# Patient Record
Sex: FEMALE | Race: WHITE | NOT HISPANIC OR LATINO | Employment: OTHER | URBAN - METROPOLITAN AREA
[De-identification: names, ages, dates, MRNs, and addresses within clinical notes are randomized per-mention and may not be internally consistent; named-entity substitution may affect disease eponyms.]

---

## 2014-08-05 LAB
CREAT ?TM UR-SCNC: 73 UMOL/L
MICROALBUMIN UR-MCNC: 0.6 MG/L (ref 0–20)
MICROALBUMIN/CREAT UR: 8 MG/G{CREAT}

## 2018-04-11 ENCOUNTER — OFFICE VISIT (OUTPATIENT)
Dept: ENDOCRINOLOGY | Facility: HOSPITAL | Age: 68
End: 2018-04-11
Payer: MEDICARE

## 2018-04-11 VITALS
SYSTOLIC BLOOD PRESSURE: 156 MMHG | HEIGHT: 66 IN | HEART RATE: 64 BPM | WEIGHT: 154.6 LBS | BODY MASS INDEX: 24.85 KG/M2 | DIASTOLIC BLOOD PRESSURE: 72 MMHG

## 2018-04-11 DIAGNOSIS — E89.0 POSTABLATIVE HYPOTHYROIDISM: Primary | ICD-10-CM

## 2018-04-11 DIAGNOSIS — E04.2 MULTIPLE THYROID NODULES: ICD-10-CM

## 2018-04-11 PROCEDURE — 99204 OFFICE O/P NEW MOD 45 MIN: CPT | Performed by: INTERNAL MEDICINE

## 2018-04-11 RX ORDER — CITALOPRAM 20 MG/1
20 TABLET ORAL DAILY
COMMUNITY
Start: 2018-02-27

## 2018-04-11 RX ORDER — LEVOTHYROXINE SODIUM 100 MCG
100 TABLET ORAL DAILY
COMMUNITY
Start: 2018-02-27 | End: 2018-04-11 | Stop reason: SDUPTHER

## 2018-04-11 RX ORDER — CALCITRIOL 0.25 UG/1
0.25 CAPSULE, LIQUID FILLED ORAL DAILY
COMMUNITY
Start: 2018-01-02

## 2018-04-11 RX ORDER — HYDROXYCHLOROQUINE SULFATE 200 MG/1
TABLET, FILM COATED ORAL
COMMUNITY

## 2018-04-11 RX ORDER — DOXAZOSIN MESYLATE 1 MG/1
1 TABLET ORAL DAILY
COMMUNITY
Start: 2018-02-21

## 2018-04-11 RX ORDER — LEVOTHYROXINE SODIUM 100 MCG
TABLET ORAL
Qty: 90 TABLET | Refills: 3 | Status: SHIPPED | OUTPATIENT
Start: 2018-04-11 | End: 2019-04-18 | Stop reason: SDUPTHER

## 2018-04-11 RX ORDER — HYDROXYPROPYL CELLULOSE 5 MG/1
INSERT OPHTHALMIC
COMMUNITY
Start: 2018-01-10

## 2018-04-11 RX ORDER — LOSARTAN POTASSIUM 100 MG/1
100 TABLET ORAL DAILY
COMMUNITY

## 2018-04-11 RX ORDER — ROSUVASTATIN CALCIUM 20 MG/1
20 TABLET, COATED ORAL DAILY
COMMUNITY
Start: 2018-02-12 | End: 2020-04-22

## 2018-04-11 RX ORDER — AMLODIPINE BESYLATE 5 MG/1
5 TABLET ORAL 2 TIMES DAILY
COMMUNITY
Start: 2018-03-14

## 2018-04-11 RX ORDER — ASPIRIN 81 MG/1
81 TABLET ORAL DAILY
COMMUNITY

## 2018-04-11 NOTE — PROGRESS NOTES
4/11/2018    Assessment/Plan      Diagnoses and all orders for this visit:    Postablative hypothyroidism  -     SYNTHROID 100 MCG tablet; 1 tab daily  BRAND NECESSARY  -     TSH, 3rd generation Lab Collect; Future  -     T4, free Lab Collect; Future  -     TSH, 3rd generation Lab Collect; Future  -     T4, free Lab Collect; Future    Multiple thyroid nodules  -     US thyroid; Future  -     US thyroid; Future    Other orders  -     amLODIPine (NORVASC) 5 mg tablet; Take 7 5 mg by mouth daily  -     calcitriol (ROCALTROL) 0 25 mcg capsule; Take 0 25 mcg by mouth daily  -     citalopram (CeleXA) 20 mg tablet; Take 20 mg by mouth daily  -     doxazosin (CARDURA) 1 mg tablet; Take 1 mg by mouth daily  -     Discontinue: SYNTHROID 100 MCG tablet; Take 100 mcg by mouth daily  -     rosuvastatin (CRESTOR) 20 MG tablet; Take 20 mg by mouth daily  -     hydroxypropyl cellulose (LACRISERT) 5 MG INST;   -     aspirin (ECOTRIN LOW STRENGTH) 81 mg EC tablet; Take 81 mg by mouth daily  -     losartan (COZAAR) 100 MG tablet; Take 100 mg by mouth daily  -     Hydroxychloroquine Sulfate (PLAQUENIL PO); Take by mouth Take one a a half tablets daily        Assessment/Plan:  1  Hypothyroidism status post I 131 treatment:  Clinically euthyroid  Will repeat TSH and free T4  Will continue Synthroid brand 100 mcg daily  Will be in touch with the patient as results come in for changes if needed  If no changes are needed, recheck TSH and free T4 before next appointment in 1 year  She will call the office sooner if she notes any symptoms of hyperthyroidism hypothyroidism for lab check sooner  2   Thyroid nodules:  She is due for an updated ultrasound which I have ordered  If this looks stable we will check another ultrasound in 1 year which will be before next appointment        CC:  Hypothyroidism and thyroid nodules    History of Present Illness     HPI: Abdi Frazier is a 70y o  year old female with history of hyperthyroidism many years ago suspected due to Graves disease though there was a mention of a possible overactive nodule who eventually underwent I 131 treatment and now has hypothyroidism  She also has a history of thyroid nodules the dominant nodule having a benign biopsy many years ago  She also has a history of Sjogren's and follows closely with her rheumatologist as well as CKD 4 and follows closely with Nephrology  She has not needed any corticosteroid treatments or injections recently  Overall, she feels well  She denies any new symptoms  She does report some fatigue but attributes that to multiple medical issues  She denies any neck compressive symptoms  She denies any family history of thyroid cancer, thyroid disease  She denies personal history of ionizing radiation to her head or neck  She denies any palpitations, tachycardia, tremor, heat intolerance  She does report some cold intolerance  She takes her Synthroid 100 mcg brand specific daily appropriately  Review of Systems   Constitutional: Positive for fatigue  Negative for chills and fever  HENT: Negative for trouble swallowing and voice change  Eyes: Negative for visual disturbance  Respiratory: Negative for shortness of breath  Cardiovascular: Negative for chest pain, palpitations and leg swelling  Gastrointestinal: Negative for abdominal pain, nausea and vomiting  Endocrine: Positive for cold intolerance  Negative for polydipsia and polyuria  Musculoskeletal: Negative for arthralgias and myalgias  Skin: Negative for rash  Neurological: Negative for dizziness, tremors and weakness  Hematological: Negative for adenopathy  Psychiatric/Behavioral: Negative for agitation and confusion  Historical Information   History reviewed  No pertinent past medical history  History reviewed  No pertinent surgical history    Social History   History   Alcohol use Not on file     History   Drug use: Unknown     History   Smoking Status    Never Smoker   Smokeless Tobacco    Never Used     Family History:   Family History   Problem Relation Age of Onset    Ovarian cancer Mother     Heart disease Father     Hypertension Father     Hyperlipidemia Father     Cancer Sister        Meds/Allergies   Current Outpatient Prescriptions   Medication Sig Dispense Refill    amLODIPine (NORVASC) 5 mg tablet Take 7 5 mg by mouth daily      aspirin (ECOTRIN LOW STRENGTH) 81 mg EC tablet Take 81 mg by mouth daily      calcitriol (ROCALTROL) 0 25 mcg capsule Take 0 25 mcg by mouth daily      citalopram (CeleXA) 20 mg tablet Take 20 mg by mouth daily      doxazosin (CARDURA) 1 mg tablet Take 1 mg by mouth daily      Hydroxychloroquine Sulfate (PLAQUENIL PO) Take by mouth Take one a a half tablets daily      hydroxypropyl cellulose (LACRISERT) 5 MG INST       losartan (COZAAR) 100 MG tablet Take 100 mg by mouth daily      rosuvastatin (CRESTOR) 20 MG tablet Take 20 mg by mouth daily      SYNTHROID 100 MCG tablet 1 tab daily  BRAND NECESSARY  90 tablet 3     No current facility-administered medications for this visit  No Known Allergies    Objective   Vitals: Blood pressure 156/72, pulse 64, height 5' 6" (1 676 m), weight 70 1 kg (154 lb 9 6 oz)  Invasive Devices          No matching active lines, drains, or airways          Physical Exam   Constitutional: She is oriented to person, place, and time  She appears well-developed and well-nourished  No distress  HENT:   Head: Normocephalic and atraumatic  Eyes: Conjunctivae and EOM are normal  Pupils are equal, round, and reactive to light  No scleral icterus  Neck: Normal range of motion  Neck supple  No thyromegaly (no nodules palpated on today's exam ) present  Cardiovascular: Normal rate and regular rhythm  No murmur heard  Pulmonary/Chest: Effort normal and breath sounds normal  She has no wheezes  Abdominal: Soft  Bowel sounds are normal  There is no tenderness     Musculoskeletal: Normal range of motion  She exhibits no edema  Neurological: She is alert and oriented to person, place, and time  She exhibits normal muscle tone  Skin: Skin is warm and dry  No rash noted  She is not diaphoretic  Psychiatric: She has a normal mood and affect  Her behavior is normal        The history was obtained from the review of the chart and from the patient  Lab Results:      No recent blood work, but ultrasound of the thyroid from 04/11/2017 showed the following: In the right lobe there is a 0 5 x 0 4 x 0 4 cm hypoechoic/solid nodule  In the left lobe there is a rim calcified 1 5 x 0 7 x 0 8 cm echogenic nodule  03/31/2017 at [facility]:  TSH 3 97, free thyroxine index 1 9  No future appointments

## 2018-04-11 NOTE — LETTER
April 11, 2018     Mikael Fabry, MD  [address]  [facility] [address]    Patient: Abida Goodman   YOB: 1950   Date of Visit: 4/11/2018       Dear Dr Negro Lazar: Thank you for referring Abida Goodman to me for evaluation  Below are my notes for this consultation  If you have questions, please do not hesitate to call me  I look forward to following your patient along with you  Sincerely,        Ning Rodriguez DO        CC: No Recipients  Ning Rodriguez DO  4/11/2018 11:37 AM  Sign at close encounter  4/11/2018    Assessment/Plan      Diagnoses and all orders for this visit:    Postablative hypothyroidism  -     SYNTHROID 100 MCG tablet; 1 tab daily  BRAND NECESSARY  -     TSH, 3rd generation Lab Collect; Future  -     T4, free Lab Collect; Future  -     TSH, 3rd generation Lab Collect; Future  -     T4, free Lab Collect; Future    Multiple thyroid nodules  -     US thyroid; Future  -     US thyroid; Future    Other orders  -     amLODIPine (NORVASC) 5 mg tablet; Take 7 5 mg by mouth daily  -     calcitriol (ROCALTROL) 0 25 mcg capsule; Take 0 25 mcg by mouth daily  -     citalopram (CeleXA) 20 mg tablet; Take 20 mg by mouth daily  -     doxazosin (CARDURA) 1 mg tablet; Take 1 mg by mouth daily  -     Discontinue: SYNTHROID 100 MCG tablet; Take 100 mcg by mouth daily  -     rosuvastatin (CRESTOR) 20 MG tablet; Take 20 mg by mouth daily  -     hydroxypropyl cellulose (LACRISERT) 5 MG INST;   -     aspirin (ECOTRIN LOW STRENGTH) 81 mg EC tablet; Take 81 mg by mouth daily  -     losartan (COZAAR) 100 MG tablet; Take 100 mg by mouth daily  -     Hydroxychloroquine Sulfate (PLAQUENIL PO); Take by mouth Take one a a half tablets daily        Assessment/Plan:  1  Hypothyroidism status post I 131 treatment:  Clinically euthyroid  Will repeat TSH and free T4  Will continue Synthroid brand 100 mcg daily    Will be in touch with the patient as results come in for changes if needed  If no changes are needed, recheck TSH and free T4 before next appointment in 1 year  She will call the office sooner if she notes any symptoms of hyperthyroidism hypothyroidism for lab check sooner  2   Thyroid nodules:  She is due for an updated ultrasound which I have ordered  If this looks stable we will check another ultrasound in 1 year which will be before next appointment  CC:  Hypothyroidism and thyroid nodules    History of Present Illness     HPI: Markos Barone is a 70y o  year old female with history of hyperthyroidism many years ago suspected due to Graves disease though there was a mention of a possible overactive nodule who eventually underwent I 131 treatment and now has hypothyroidism  She also has a history of thyroid nodules the dominant nodule having a benign biopsy many years ago  She also has a history of Sjogren's and follows closely with her rheumatologist as well as CKD 4 and follows closely with Nephrology  She has not needed any corticosteroid treatments or injections recently  Overall, she feels well  She denies any new symptoms  She does report some fatigue but attributes that to multiple medical issues  She denies any neck compressive symptoms  She denies any family history of thyroid cancer, thyroid disease  She denies personal history of ionizing radiation to her head or neck  She denies any palpitations, tachycardia, tremor, heat intolerance  She does report some cold intolerance  She takes her Synthroid 100 mcg brand specific daily appropriately  Review of Systems   Constitutional: Positive for fatigue  Negative for chills and fever  HENT: Negative for trouble swallowing and voice change  Eyes: Negative for visual disturbance  Respiratory: Negative for shortness of breath  Cardiovascular: Negative for chest pain, palpitations and leg swelling  Gastrointestinal: Negative for abdominal pain, nausea and vomiting     Endocrine: Positive for cold intolerance  Negative for polydipsia and polyuria  Musculoskeletal: Negative for arthralgias and myalgias  Skin: Negative for rash  Neurological: Negative for dizziness, tremors and weakness  Hematological: Negative for adenopathy  Psychiatric/Behavioral: Negative for agitation and confusion  Historical Information   History reviewed  No pertinent past medical history  History reviewed  No pertinent surgical history  Social History   History   Alcohol use Not on file     History   Drug use: Unknown     History   Smoking Status    Never Smoker   Smokeless Tobacco    Never Used     Family History:   Family History   Problem Relation Age of Onset    Ovarian cancer Mother     Heart disease Father     Hypertension Father     Hyperlipidemia Father     Cancer Sister        Meds/Allergies   Current Outpatient Prescriptions   Medication Sig Dispense Refill    amLODIPine (NORVASC) 5 mg tablet Take 7 5 mg by mouth daily      aspirin (ECOTRIN LOW STRENGTH) 81 mg EC tablet Take 81 mg by mouth daily      calcitriol (ROCALTROL) 0 25 mcg capsule Take 0 25 mcg by mouth daily      citalopram (CeleXA) 20 mg tablet Take 20 mg by mouth daily      doxazosin (CARDURA) 1 mg tablet Take 1 mg by mouth daily      Hydroxychloroquine Sulfate (PLAQUENIL PO) Take by mouth Take one a a half tablets daily      hydroxypropyl cellulose (LACRISERT) 5 MG INST       losartan (COZAAR) 100 MG tablet Take 100 mg by mouth daily      rosuvastatin (CRESTOR) 20 MG tablet Take 20 mg by mouth daily      SYNTHROID 100 MCG tablet 1 tab daily  BRAND NECESSARY  90 tablet 3     No current facility-administered medications for this visit  No Known Allergies    Objective   Vitals: Blood pressure 156/72, pulse 64, height 5' 6" (1 676 m), weight 70 1 kg (154 lb 9 6 oz)  Invasive Devices          No matching active lines, drains, or airways          Physical Exam   Constitutional: She is oriented to person, place, and time   She appears well-developed and well-nourished  No distress  HENT:   Head: Normocephalic and atraumatic  Eyes: Conjunctivae and EOM are normal  Pupils are equal, round, and reactive to light  No scleral icterus  Neck: Normal range of motion  Neck supple  No thyromegaly (no nodules palpated on today's exam ) present  Cardiovascular: Normal rate and regular rhythm  No murmur heard  Pulmonary/Chest: Effort normal and breath sounds normal  She has no wheezes  Abdominal: Soft  Bowel sounds are normal  There is no tenderness  Musculoskeletal: Normal range of motion  She exhibits no edema  Neurological: She is alert and oriented to person, place, and time  She exhibits normal muscle tone  Skin: Skin is warm and dry  No rash noted  She is not diaphoretic  Psychiatric: She has a normal mood and affect  Her behavior is normal        The history was obtained from the review of the chart and from the patient  Lab Results:      No recent blood work, but ultrasound of the thyroid from 04/11/2017 showed the following: In the right lobe there is a 0 5 x 0 4 x 0 4 cm hypoechoic/solid nodule  In the left lobe there is a rim calcified 1 5 x 0 7 x 0 8 cm echogenic nodule  03/31/2017 at [facility]:  TSH 3 97, free thyroxine index 1 9  No future appointments

## 2018-05-01 ENCOUNTER — TELEPHONE (OUTPATIENT)
Dept: ENDOCRINOLOGY | Facility: HOSPITAL | Age: 68
End: 2018-05-01

## 2018-05-01 NOTE — TELEPHONE ENCOUNTER
Received labs and ultrasound as listed below  Please let the patient know her thyroid blood work looks good and her ultrasound looks stable without change  Received labs from [facility] done on 04/27/2018: White blood cells 5 1, hemoglobin 10 6, hematocrit 34 2, platelets 544, 25 hydroxy vitamin-D 33 8, free T4 0 84, TSH 2 71, ferritin 19, iron 85, TIBC 405, saturation 21, , calcium 10 1  Ultrasound of the thyroid done on 04/27/2018 at [facility]:  In the right lobe there are subcentimeter cystic nodule between 5 and 6 mm in greatest dimension with no interval change  In the left lobe there is a hypoechoic nodule peripheral eggshell calcifications measuring 1 5 x 0 7 x 0 8 cm unchanged

## 2018-12-18 DIAGNOSIS — E04.2 MULTIPLE THYROID NODULES: ICD-10-CM

## 2019-04-18 ENCOUNTER — OFFICE VISIT (OUTPATIENT)
Dept: ENDOCRINOLOGY | Facility: HOSPITAL | Age: 69
End: 2019-04-18
Payer: MEDICARE

## 2019-04-18 VITALS
SYSTOLIC BLOOD PRESSURE: 146 MMHG | WEIGHT: 152.8 LBS | HEIGHT: 66 IN | DIASTOLIC BLOOD PRESSURE: 80 MMHG | BODY MASS INDEX: 24.56 KG/M2 | HEART RATE: 80 BPM

## 2019-04-18 DIAGNOSIS — E04.2 MULTIPLE THYROID NODULES: Primary | ICD-10-CM

## 2019-04-18 DIAGNOSIS — E89.0 POSTABLATIVE HYPOTHYROIDISM: ICD-10-CM

## 2019-04-18 PROCEDURE — 99214 OFFICE O/P EST MOD 30 MIN: CPT | Performed by: NURSE PRACTITIONER

## 2019-04-18 RX ORDER — LEVOTHYROXINE SODIUM 100 MCG
TABLET ORAL
Qty: 90 TABLET | Refills: 3 | Status: SHIPPED | OUTPATIENT
Start: 2019-04-18 | End: 2019-12-30 | Stop reason: SDUPTHER

## 2019-10-25 ENCOUNTER — OFFICE VISIT (OUTPATIENT)
Dept: URGENT CARE | Facility: CLINIC | Age: 69
End: 2019-10-25
Payer: MEDICARE

## 2019-10-25 ENCOUNTER — APPOINTMENT (OUTPATIENT)
Dept: RADIOLOGY | Facility: CLINIC | Age: 69
End: 2019-10-25
Payer: MEDICARE

## 2019-10-25 VITALS
TEMPERATURE: 98.7 F | WEIGHT: 153 LBS | RESPIRATION RATE: 16 BRPM | BODY MASS INDEX: 24.59 KG/M2 | OXYGEN SATURATION: 98 % | DIASTOLIC BLOOD PRESSURE: 66 MMHG | SYSTOLIC BLOOD PRESSURE: 122 MMHG | HEART RATE: 65 BPM | HEIGHT: 66 IN

## 2019-10-25 DIAGNOSIS — M25.551 PAIN OF RIGHT HIP JOINT: ICD-10-CM

## 2019-10-25 DIAGNOSIS — M54.31 SCIATIC PAIN, RIGHT: Primary | ICD-10-CM

## 2019-10-25 PROCEDURE — G0463 HOSPITAL OUTPT CLINIC VISIT: HCPCS | Performed by: PREVENTIVE MEDICINE

## 2019-10-25 PROCEDURE — 99203 OFFICE O/P NEW LOW 30 MIN: CPT | Performed by: PREVENTIVE MEDICINE

## 2019-10-25 NOTE — PROGRESS NOTES
[facility]        NAME: Michaela Saez is a 78 y o  female  : 1950    MRN: [id number]  DATE: 2019  TIME: 4:16 PM    Assessment and Plan   Sciatic pain, right [M54 31]  1  Sciatic pain, right     2  Pain of right hip joint  XR hip/pelv 2-3 vws right if performed         Patient Instructions       Follow up with PCP in 3-5 days  Proceed to  ER if symptoms worsen  Chief Complaint     Chief Complaint   Patient presents with    Hip Pain     Pt reports falling approx 3 weeks ago  She cpresents today with ongoing right sided hip pain that radiates down her tight leg  History of Present Illness       Three weeks ago she fell  Since that time she is having aching pain that proceed from the low back area down to the buttock down into the leg  She also notices some numbness at the bottom of right foot  She has not noticed the right leg buckling on her  Review of Systems   Review of Systems   Musculoskeletal: Positive for back pain and myalgias  Current Medications       Current Outpatient Medications:     amLODIPine (NORVASC) 5 mg tablet, Take 7 5 mg by mouth daily, Disp: , Rfl:     aspirin (ECOTRIN LOW STRENGTH) 81 mg EC tablet, Take 81 mg by mouth daily, Disp: , Rfl:     calcitriol (ROCALTROL) 0 25 mcg capsule, Take 0 25 mcg by mouth daily, Disp: , Rfl:     citalopram (CeleXA) 20 mg tablet, Take 20 mg by mouth daily, Disp: , Rfl:     doxazosin (CARDURA) 1 mg tablet, Take 1 mg by mouth daily, Disp: , Rfl:     Hydroxychloroquine Sulfate (PLAQUENIL PO), Take by mouth Take one a a half tablets daily, Disp: , Rfl:     hydroxypropyl cellulose (LACRISERT) 5 MG INST, , Disp: , Rfl:     losartan (COZAAR) 100 MG tablet, Take 100 mg by mouth daily, Disp: , Rfl:     SYNTHROID 100 MCG tablet, 1 tab daily    BRAND NECESSARY , Disp: 90 tablet, Rfl: 3    rosuvastatin (CRESTOR) 20 MG tablet, Take 20 mg by mouth daily, Disp: , Rfl:     Current Allergies     Allergies as of 10/25/2019    (No Known Allergies)            The following portions of the patient's history were reviewed and updated as appropriate: allergies, current medications, past family history, past medical history, past social history, past surgical history and problem list      Past Medical History:   Diagnosis Date    Renal disease        No past surgical history on file  Family History   Problem Relation Age of Onset    Ovarian cancer Mother     Heart disease Father     Hypertension Father     Hyperlipidemia Father     Cancer Sister          Medications have been verified  Objective   /66   Pulse 65   Temp 98 7 °F (37 1 °C)   Resp 16   Ht 5' 6" (1 676 m)   Wt 69 4 kg (153 lb)   SpO2 98%   BMI 24 69 kg/m²        Physical Exam     Physical Exam   Musculoskeletal:   Seated straight leg raise is positive on the right at 90° negative on the left and 90°  Lying straight leg raise is positive on the right at 45° negative on the left at 45°  Both knee jerks are 1+ both ankle jerks are 0  She has excellent strength of the right lower extremity to include hamstring quadriceps hip flexor plantar and dorsiflexion  Mild decreased to pinprick on the bottom of the right foot

## 2019-10-25 NOTE — PATIENT INSTRUCTIONS
I believe your having sciatic nerve irritation  When you see your back and spine specialist you need to have them evaluate you and prescribed treatment     In the meantime use copious amounts of ice and tramadol for pain

## 2019-12-30 DIAGNOSIS — E89.0 POSTABLATIVE HYPOTHYROIDISM: Primary | ICD-10-CM

## 2019-12-30 RX ORDER — LEVOTHYROXINE SODIUM 100 MCG
TABLET ORAL
Qty: 90 TABLET | Refills: 3 | Status: SHIPPED | OUTPATIENT
Start: 2019-12-30 | End: 2020-04-22 | Stop reason: SDUPTHER

## 2019-12-30 NOTE — TELEPHONE ENCOUNTER
Mendel sent a request to refill pt's Synthroid  She saw Alanna Kerr in April and has a follow up 4/20/20 with you

## 2020-04-22 ENCOUNTER — TELEMEDICINE (OUTPATIENT)
Dept: ENDOCRINOLOGY | Facility: HOSPITAL | Age: 70
End: 2020-04-22
Payer: MEDICARE

## 2020-04-22 DIAGNOSIS — E89.0 POSTABLATIVE HYPOTHYROIDISM: ICD-10-CM

## 2020-04-22 DIAGNOSIS — E04.2 MULTIPLE THYROID NODULES: Primary | ICD-10-CM

## 2020-04-22 PROCEDURE — 99441 PR PHYS/QHP TELEPHONE EVALUATION 5-10 MIN: CPT | Performed by: INTERNAL MEDICINE

## 2020-04-22 RX ORDER — PRAVASTATIN SODIUM 20 MG
40 TABLET ORAL DAILY
COMMUNITY
Start: 2020-01-27

## 2020-04-22 RX ORDER — LEVOTHYROXINE SODIUM 100 MCG
TABLET ORAL
Qty: 90 TABLET | Refills: 3 | Status: SHIPPED | OUTPATIENT
Start: 2020-04-22 | End: 2020-09-14 | Stop reason: SDUPTHER

## 2020-09-08 ENCOUNTER — TELEPHONE (OUTPATIENT)
Dept: ENDOCRINOLOGY | Facility: HOSPITAL | Age: 70
End: 2020-09-08

## 2020-09-09 ENCOUNTER — TELEPHONE (OUTPATIENT)
Dept: ENDOCRINOLOGY | Facility: HOSPITAL | Age: 70
End: 2020-09-09

## 2020-09-09 NOTE — TELEPHONE ENCOUNTER
Pt has 10/29 appt  She had labs done at [facility] on 9/2 for pth & also thyroid labs  I explained we have not received results for thyroid labs  I will request from [facility] & have Dr Emma Donohue review

## 2020-09-14 DIAGNOSIS — E89.0 POSTABLATIVE HYPOTHYROIDISM: ICD-10-CM

## 2020-09-14 DIAGNOSIS — E04.2 MULTIPLE THYROID NODULES: Primary | ICD-10-CM

## 2020-09-14 DIAGNOSIS — E21.3 HYPERPARATHYROIDISM (HCC): ICD-10-CM

## 2020-09-14 RX ORDER — LEVOTHYROXINE SODIUM 100 MCG
TABLET ORAL
Qty: 90 TABLET | Refills: 3
Start: 2020-09-14 | End: 2020-10-29 | Stop reason: SDUPTHER

## 2020-09-14 NOTE — TELEPHONE ENCOUNTER
I would suggest that we increase her Synthroid dose to 1 tablet days a week 1 5 tablets on Sundays and repeat labs prior to her next appointment

## 2020-09-14 NOTE — TELEPHONE ENCOUNTER
Please let the patient know I reviewed her labs from 09/02  Her thyroid levels are much lower since prior values from April  Has she missed any doses of Synthroid? Also her PTH level is elevated which we can review at upcoming appointment next month  Labs from [facility] on 09/02/2020:  TSH 13 36, free T4 0 59, creatinine 2 28, GFR 21, calcium 9 8,

## 2020-10-29 ENCOUNTER — OFFICE VISIT (OUTPATIENT)
Dept: ENDOCRINOLOGY | Facility: HOSPITAL | Age: 70
End: 2020-10-29
Payer: MEDICARE

## 2020-10-29 VITALS
HEIGHT: 66 IN | DIASTOLIC BLOOD PRESSURE: 72 MMHG | HEART RATE: 56 BPM | BODY MASS INDEX: 22.53 KG/M2 | TEMPERATURE: 98.2 F | SYSTOLIC BLOOD PRESSURE: 154 MMHG | WEIGHT: 140.2 LBS

## 2020-10-29 DIAGNOSIS — E89.0 POSTABLATIVE HYPOTHYROIDISM: Primary | ICD-10-CM

## 2020-10-29 DIAGNOSIS — E04.2 MULTIPLE THYROID NODULES: ICD-10-CM

## 2020-10-29 PROCEDURE — 99213 OFFICE O/P EST LOW 20 MIN: CPT | Performed by: INTERNAL MEDICINE

## 2020-10-29 RX ORDER — LEVOTHYROXINE SODIUM 100 MCG
TABLET ORAL
Qty: 102 TABLET | Refills: 3 | Status: SHIPPED | OUTPATIENT
Start: 2020-10-29 | End: 2021-05-04 | Stop reason: SDUPTHER

## 2020-10-29 RX ORDER — SODIUM BICARBONATE 650 MG/1
650 TABLET ORAL DAILY
COMMUNITY
Start: 2020-09-29

## 2021-01-22 ENCOUNTER — TELEPHONE (OUTPATIENT)
Dept: ENDOCRINOLOGY | Facility: HOSPITAL | Age: 71
End: 2021-01-22

## 2021-01-22 NOTE — TELEPHONE ENCOUNTER
Reviewed lab work from [facility] on 01/20/2021  TSH was slightly elevated at 5 75 and free T4 was normal at 0 94  These values are improved since her last appointment  If she is feeling well, suggest continue current regimen repeat labs prior to next appointment as planned and trend labs over time

## 2021-04-28 LAB
CREAT ?TM UR-SCNC: 48 UMOL/L
EXT PROTEIN URINE: 8
PROT/CREAT UR: 0.16 MG/G{CREAT}

## 2021-05-04 ENCOUNTER — OFFICE VISIT (OUTPATIENT)
Dept: ENDOCRINOLOGY | Facility: HOSPITAL | Age: 71
End: 2021-05-04
Payer: MEDICARE

## 2021-05-04 ENCOUNTER — TELEPHONE (OUTPATIENT)
Dept: ADMINISTRATIVE | Facility: OTHER | Age: 71
End: 2021-05-04

## 2021-05-04 VITALS
SYSTOLIC BLOOD PRESSURE: 132 MMHG | HEIGHT: 66 IN | DIASTOLIC BLOOD PRESSURE: 70 MMHG | HEART RATE: 61 BPM | WEIGHT: 141.8 LBS | BODY MASS INDEX: 22.79 KG/M2

## 2021-05-04 DIAGNOSIS — E04.2 MULTIPLE THYROID NODULES: ICD-10-CM

## 2021-05-04 DIAGNOSIS — E21.3 HYPERPARATHYROIDISM (HCC): ICD-10-CM

## 2021-05-04 DIAGNOSIS — E89.0 POSTABLATIVE HYPOTHYROIDISM: Primary | ICD-10-CM

## 2021-05-04 PROCEDURE — 99214 OFFICE O/P EST MOD 30 MIN: CPT | Performed by: PHYSICIAN ASSISTANT

## 2021-05-04 RX ORDER — LEVOTHYROXINE SODIUM 100 MCG
TABLET ORAL
Qty: 110 TABLET | Refills: 3 | Status: SHIPPED | OUTPATIENT
Start: 2021-05-04 | End: 2022-03-31

## 2021-05-04 NOTE — LETTER
Procedure Request Form: Colonoscopy      Date Requested: 21  Patient: Daja Cover Long  Patient : 1950   Referring Provider: Iram Stein, MD        Date of Procedure ______________________________       The above patient has informed us that they have completed their   most recent Colonoscopy at your facility  Please complete   this form and attach all corresponding procedure reports/results  Comments __________________________________________________________  ____________________________________________________________________  ____________________________________________________________________  ____________________________________________________________________    Facility Completing Procedure _________________________________________    Form Completed By (print name) _______________________________________      Signature __________________________________________________________      These reports are needed for  compliance    Please fax this completed form and a copy of the procedure report to our office located at [place] [address] as soon as possible to [phone] attention Angelia Matamoros: Phone [phone]    We thank you for your assistance in treating our mutual patient

## 2021-05-04 NOTE — TELEPHONE ENCOUNTER
----- Message from Marko Song, [address] [place] sent at 5/4/2021 10:29 AM EDT -----  Regarding: colonoscopy  05/04/21 10:29 AM    Hello, our patient Paris Melo has had a colonoscopy completed/performed  Please assist in updating the patient chart by [facility]  The date of service is in the past 10 years      Thank you,  Marko Song MA  [facility] [place]

## 2021-05-04 NOTE — PATIENT INSTRUCTIONS
Increase Synthroid to 100 mcg 5 days a week, 1 5 tablets on Saturday, and 2 tablets on Sunday  Repeat lab work in 6 weeks  Follow up in 6 months with lab work and ultrasound completed 
None

## 2021-05-04 NOTE — PROGRESS NOTES
Graciela Wan 66 y o  female MRN: [id number]    Encounter: [id number]      Assessment/Plan     Assessment: This is a 66y o -year-old female with hypothyroidism and thyroid nodule  Plan:    1  Hypothyroidism:  No recent thyroid panel, but thyroid lab work in January did show an elevated TSH and normal free T4  Has had improvement in her symptoms, but still does fill a little run down  Will increase her Synthroid to 100 mcg 5 days a week, 1 5 tablets on Saturday, and 2 tablets on Sunday  Repeat lab work in 6 weeks  Will give a call with results  If needed may make additional adjustments  Follow-up in 6 months with lab work completed prior to visit  2  Thyroid nodules:  Ultrasound from October 2021 showed stable bilateral thyroid nodules  Due to her concerns, will continue with yearly follow-ups of ultrasound  If there is any concerns for change in symptoms in the meantime, please contact the office  Repeat ultrasound October 2022  3  Hyperparathyroidism:  Levels are stable  This is likely due to her chronic kidney disease  Vitamin-D and calciums are within normal range  Will continue to monitor at this time  CC:   Thyroid follow-up    History of Present Illness     HPI:  Sammy Colon is a 69y o  year old female with history of hyperthyroidism many years ago suspected to be due to Graves disease other was mention of a possible overactive nodule who eventually underwent I 131 treatment and now with hypothyroidism presents for a follow-up appointment  She also has an elevated parathyroid hormone level, but this could be related to her CKD stage 4 which she is following up with nephrology  She has history of a thyroid nodule with a benign biopsy many years ago  She continues on Synthroid brand 100 mcg daily 6 days a week 2 tablets on Sundays  She presents today overall feeling okay  She is taking her Synthroid appropriately  She is not on any calcium, iron    She stopped her PPI back in April   No multivitamin or other B complex or other over-the-counter supplements or vitamins  Continues to have some fatigue  Denies any heat or cold intolerance, palpitations, abdominal pain, diarrhea or constipation, tremors, dry skin, brittle nails, hair loss, depression or anxiety  No new medical conditions since last office visit  Review of Systems   Constitutional: Positive for fatigue  Negative for activity change, appetite change, diaphoresis and unexpected weight change  HENT: Negative for sore throat, trouble swallowing and voice change  Eyes: Negative for visual disturbance  Respiratory: Negative for chest tightness and shortness of breath  Cardiovascular: Negative for chest pain, palpitations and leg swelling  Gastrointestinal: Negative for abdominal pain, constipation and diarrhea  Endocrine: Negative for cold intolerance, heat intolerance, polydipsia, polyphagia and polyuria  Skin: Negative for rash  Neurological: Negative for dizziness, tremors, light-headedness, numbness and headaches  Hematological: Negative for adenopathy  Psychiatric/Behavioral: Negative for dysphoric mood and sleep disturbance  The patient is not nervous/anxious  Historical Information   Past Medical History:   Diagnosis Date    Renal disease      History reviewed  No pertinent surgical history    Social History   Social History     Substance and Sexual Activity   Alcohol Use None     Social History     Substance and Sexual Activity   Drug Use Not on file     Social History     Tobacco Use   Smoking Status Never Smoker   Smokeless Tobacco Never Used     Family History:   Family History   Problem Relation Age of Onset    Ovarian cancer Mother     Heart disease Father     Hypertension Father     Hyperlipidemia Father     Cancer Sister        Meds/Allergies   Current Outpatient Medications   Medication Sig Dispense Refill    amLODIPine (NORVASC) 5 mg tablet Take 5 mg by mouth 2 (two) times a day       aspirin (ECOTRIN LOW STRENGTH) 81 mg EC tablet Take 81 mg by mouth daily      calcitriol (ROCALTROL) 0 25 mcg capsule Take 0 25 mcg by mouth daily      citalopram (CeleXA) 20 mg tablet Take 20 mg by mouth daily      doxazosin (CARDURA) 1 mg tablet Take 1 mg by mouth daily      hydroxychloroquine (Plaquenil) 200 mg tablet Take by mouth Take one a a half tablets daily       hydroxypropyl cellulose (LACRISERT) 5 MG INST       losartan (COZAAR) 100 MG tablet Take 100 mg by mouth daily      pravastatin (PRAVACHOL) 20 mg tablet       sodium bicarbonate 650 mg tablet Take 650 mg by mouth 2 (two) times a day      Synthroid 100 MCG tablet 1 tab daily 6 days a week 2 tablets on Sundays  BRAND NECESSARY  102 tablet 3     No current facility-administered medications for this visit  No Known Allergies    Objective   Vitals: Blood pressure 132/70, pulse 61, height 5' 6" (1 676 m), weight 64 3 kg (141 lb 12 8 oz)  Physical Exam  Vitals signs and nursing note reviewed  Constitutional:       General: She is not in acute distress  HENT:      Head: Normocephalic and atraumatic  Eyes:      Pupils: Pupils are equal, round, and reactive to light  Neck:      Musculoskeletal: Normal range of motion  Thyroid: Thyroid mass present  No thyromegaly or thyroid tenderness  Cardiovascular:      Rate and Rhythm: Normal rate and regular rhythm  Heart sounds: No murmur  Pulmonary:      Effort: Pulmonary effort is normal  No respiratory distress  Breath sounds: Normal breath sounds  No wheezing  Musculoskeletal:         General: No swelling  Lymphadenopathy:      Cervical: No cervical adenopathy  Neurological:      Mental Status: She is alert and oriented to person, place, and time  Mental status is at baseline  Sensory: No sensory deficit  Motor: No tremor        Deep Tendon Reflexes:      Reflex Scores:       Patellar reflexes are 2+ on the right side and 2+ on the left side   Psychiatric:         Mood and Affect: Mood normal          Behavior: Behavior normal          Thought Content: Thought content normal          The history was obtained from the review of the chart, patient  Portions of the record may have been created with voice recognition software  Occasional wrong word or "sound a like" substitutions may have occurred due to the inherent limitations of voice recognition software  Read the chart carefully and recognize, using context, where substitutions have occurred

## 2021-05-05 NOTE — TELEPHONE ENCOUNTER
Upon review of the In Basket request and the patient's chart, initial outreach has been made via fax, please see Contacts section for details       Thank you  Espinoza Olvieira MA

## 2021-05-06 NOTE — TELEPHONE ENCOUNTER
Upon review of the In Basket request we were able to locate, review, and update the patient chart as requested for CRC: Colonoscopy  Any additional questions or concerns should be emailed to the Practice Liaisons via [email or web address] email, please do not reply via In Basket      Thank you  Bridgett Howard MA

## 2021-11-09 ENCOUNTER — OFFICE VISIT (OUTPATIENT)
Dept: ENDOCRINOLOGY | Facility: HOSPITAL | Age: 71
End: 2021-11-09
Payer: MEDICARE

## 2021-11-09 VITALS
HEART RATE: 60 BPM | DIASTOLIC BLOOD PRESSURE: 62 MMHG | SYSTOLIC BLOOD PRESSURE: 118 MMHG | HEIGHT: 66 IN | WEIGHT: 142.4 LBS | BODY MASS INDEX: 22.88 KG/M2

## 2021-11-09 DIAGNOSIS — E89.0 POSTABLATIVE HYPOTHYROIDISM: Primary | ICD-10-CM

## 2021-11-09 DIAGNOSIS — E04.2 MULTIPLE THYROID NODULES: ICD-10-CM

## 2021-11-09 PROBLEM — I10 HYPERTENSION: Status: ACTIVE | Noted: 2021-11-09

## 2021-11-09 PROBLEM — M35.00 SJOGREN'S DISEASE (HCC): Status: ACTIVE | Noted: 2021-11-09

## 2021-11-09 PROBLEM — N18.9 CHRONIC KIDNEY DISEASE: Status: ACTIVE | Noted: 2021-11-09

## 2021-11-09 PROCEDURE — 99214 OFFICE O/P EST MOD 30 MIN: CPT | Performed by: INTERNAL MEDICINE

## 2021-11-09 RX ORDER — CYCLOSPORINE 0.5 MG/ML
EMULSION OPHTHALMIC
COMMUNITY
Start: 2021-11-02

## 2021-11-09 RX ORDER — OMEPRAZOLE 20 MG/1
CAPSULE, DELAYED RELEASE ORAL
COMMUNITY
Start: 2021-10-18

## 2022-03-30 DIAGNOSIS — E89.0 POSTABLATIVE HYPOTHYROIDISM: ICD-10-CM

## 2022-03-31 RX ORDER — LEVOTHYROXINE SODIUM 100 MCG
TABLET ORAL
Qty: 110 TABLET | Refills: 3 | Status: SHIPPED | OUTPATIENT
Start: 2022-03-31

## 2023-02-28 DIAGNOSIS — E89.0 POSTABLATIVE HYPOTHYROIDISM: ICD-10-CM

## 2023-03-01 RX ORDER — LEVOTHYROXINE SODIUM 100 MCG
TABLET ORAL
Qty: 107 TABLET | Refills: 3 | Status: SHIPPED | OUTPATIENT
Start: 2023-03-01 | End: 2023-03-02 | Stop reason: SDUPTHER

## 2023-03-02 ENCOUNTER — OFFICE VISIT (OUTPATIENT)
Dept: ENDOCRINOLOGY | Facility: CLINIC | Age: 73
End: 2023-03-02

## 2023-03-02 VITALS
SYSTOLIC BLOOD PRESSURE: 136 MMHG | BODY MASS INDEX: 23.91 KG/M2 | WEIGHT: 148.8 LBS | HEIGHT: 66 IN | DIASTOLIC BLOOD PRESSURE: 70 MMHG | HEART RATE: 58 BPM

## 2023-03-02 DIAGNOSIS — E04.2 MULTIPLE THYROID NODULES: Primary | ICD-10-CM

## 2023-03-02 DIAGNOSIS — E89.0 POSTABLATIVE HYPOTHYROIDISM: ICD-10-CM

## 2023-03-02 DIAGNOSIS — E21.3 HYPERPARATHYROIDISM (HCC): ICD-10-CM

## 2023-03-02 RX ORDER — LEVOTHYROXINE SODIUM 100 MCG
TABLET ORAL
Qty: 107 TABLET | Refills: 3 | Status: SHIPPED | OUTPATIENT
Start: 2023-03-02

## 2023-03-02 NOTE — PROGRESS NOTES
3/2/2023    Assessment/Plan      Diagnoses and all orders for this visit:    Multiple thyroid nodules  -     US thyroid; Future    Postablative hypothyroidism  -     TSH, 3rd generation; Future  -     T4, free; Future  -     Synthroid 100 MCG tablet; TAKE 1 TABLET BY MOUTH   DAILY 5 DAYS PER WEEK, 1  AND 1/2 TABLETS ON  SATURDAY, AND 2 TABLETS ON  SUNDAYS    Hyperparathyroidism ([facility] [address] )  -     Comprehensive metabolic panel; Future  -     PTH, intact; Future  -     Vitamin D 25 hydroxy; Future        Assessment/Plan:  1  Hypothyroidism: Clinically biochemically overall doing well on current regimen  We will continue current regimen and see her back in the office in 1 year with labs just prior  2   Thyroid nodules: These appear stable  Repeat ultrasound prior to next appointment should be in 1 year  3   Hyperparathyroidism: Suspected to be on the basis of CKD  Follows closely with nephrology  CC: Follow-up    History of Present Illness     HPI: Emily Shin is a 75y o  year old female with history of many years ago suspected to be due to Graves' disease so there was a mention in prior records of possible overactive nodules, but in any case she underwent radioactive iodine treatment now with post ablative hypothyroidism who presents for a follow-up appointment  She also has a history of CKD and follows closely with nephrology  She also reports following with cardiology regularly as well  She has a history of hyperparathyroidism which is suspected to be due to chronic kidney disease  She does have a history of thyroid nodule with a benign biopsy years ago  She continues on Synthroid brand specific 100 mcg tablets and takes 1 tablet 5 days a week and 1 5 tablets Saturdays and 2 tabs Sundays  Today for a follow-up visit overall feeling well  No new neck compressive symptoms  Review of Systems   Constitutional: Negative for fatigue  HENT: Negative for trouble swallowing and voice change      Eyes: Negative for visual disturbance  Respiratory: Negative for shortness of breath  Cardiovascular: Negative for palpitations and leg swelling  Gastrointestinal: Negative for abdominal pain, nausea and vomiting  Endocrine: Negative for polydipsia and polyuria  Musculoskeletal: Negative for arthralgias and myalgias  Skin: Negative for rash  Neurological: Negative for dizziness, tremors and weakness  Hematological: Negative for adenopathy  Psychiatric/Behavioral: Negative for agitation and confusion  Historical Information   Past Medical History:   Diagnosis Date   • Renal disease      History reviewed  No pertinent surgical history    Social History   Social History     Substance and Sexual Activity   Alcohol Use None     Social History     Substance and Sexual Activity   Drug Use Not on file     Social History     Tobacco Use   Smoking Status Never   Smokeless Tobacco Never     Family History:   Family History   Problem Relation Age of Onset   • Ovarian cancer Mother    • Heart disease Father    • Hypertension Father    • Hyperlipidemia Father    • Cancer Sister        Meds/Allergies   Current Outpatient Medications   Medication Sig Dispense Refill   • amLODIPine (NORVASC) 5 mg tablet Take 5 mg by mouth 2 (two) times a day      • aspirin (ECOTRIN LOW STRENGTH) 81 mg EC tablet Take 81 mg by mouth daily     • calcitriol (ROCALTROL) 0 25 mcg capsule Take 0 25 mcg by mouth daily     • citalopram (CeleXA) 20 mg tablet Take 20 mg by mouth daily     • doxazosin (CARDURA) 1 mg tablet Take 1 mg by mouth daily     • hydroxychloroquine (PLAQUENIL) 200 mg tablet Take by mouth Take one a a half tablets daily      • losartan (COZAAR) 100 MG tablet Take 100 mg by mouth daily     • omeprazole (PriLOSEC) 20 mg delayed release capsule      • pravastatin (PRAVACHOL) 20 mg tablet Take 40 mg by mouth daily       • Restasis 0 05 % ophthalmic emulsion      • sodium bicarbonate 650 mg tablet Take 650 mg by mouth daily • Synthroid 100 MCG tablet TAKE 1 TABLET BY MOUTH   DAILY 5 DAYS PER WEEK, 1  AND 1/2 TABLETS ON  SATURDAY, AND 2 TABLETS ON  SUNDAYS 107 tablet 3   • hydroxypropyl cellulose (LACRISERT) 5 MG INST   (Patient not taking: Reported on 3/2/2023)       No current facility-administered medications for this visit  No Known Allergies    Objective   Vitals: Blood pressure 136/70, pulse 58, height 5' 6" (1 676 m), weight 67 5 kg (148 lb 12 8 oz)  Invasive Devices     None                 Physical Exam  Vitals reviewed  Constitutional:       General: She is not in acute distress  Appearance: She is well-developed  She is not diaphoretic  HENT:      Head: Normocephalic and atraumatic  Eyes:      Conjunctiva/sclera: Conjunctivae normal       Pupils: Pupils are equal, round, and reactive to light  Neck:      Thyroid: No thyromegaly  Cardiovascular:      Rate and Rhythm: Normal rate and regular rhythm  Pulmonary:      Effort: Pulmonary effort is normal  No respiratory distress  Breath sounds: Normal breath sounds  Abdominal:      General: Bowel sounds are normal       Palpations: Abdomen is soft  Musculoskeletal:         General: Normal range of motion  Cervical back: Normal range of motion and neck supple  Skin:     General: Skin is warm and dry  Findings: No rash  Neurological:      Mental Status: She is alert and oriented to person, place, and time  Motor: No abnormal muscle tone  Psychiatric:         Behavior: Behavior normal          The history was obtained from the review of the chart and from the patient  Lab Results:      Ultrasound of the thyroid at [facility] on 10/21/2022: In the right lobe of the thyroid in the anterior aspect the mid right lobe is an anechoic nodule appearing like a cyst measuring 5 x 3 x 5 mm  In the left lobe of the thyroid there is a hypoechoic nodule measuring 14 x 8 x 8 mm previously 14 mm in greatest dimension      Labs from [facility] on 10/21/2022:  TSH 2 04, free T41 01    No future appointments  Portions of the record may have been created with voice recognition software  Occasional wrong word or "sound a like" substitutions may have occurred due to the inherent limitations of voice recognition software  Read the chart carefully and recognize, using context, where substitutions have occurred

## 2023-10-17 DIAGNOSIS — E89.0 POSTABLATIVE HYPOTHYROIDISM: ICD-10-CM

## 2023-10-17 RX ORDER — LEVOTHYROXINE SODIUM 100 MCG
TABLET ORAL
Qty: 107 TABLET | Refills: 3 | Status: SHIPPED | OUTPATIENT
Start: 2023-10-17

## 2024-03-01 ENCOUNTER — APPOINTMENT (OUTPATIENT)
Dept: LAB | Facility: HOSPITAL | Age: 74
End: 2024-03-01
Payer: MEDICARE

## 2024-03-01 ENCOUNTER — HOSPITAL ENCOUNTER (OUTPATIENT)
Dept: ULTRASOUND IMAGING | Facility: HOSPITAL | Age: 74
End: 2024-03-01
Attending: INTERNAL MEDICINE
Payer: MEDICARE

## 2024-03-01 DIAGNOSIS — E21.3 HYPERPARATHYROIDISM (HCC): ICD-10-CM

## 2024-03-01 DIAGNOSIS — E04.2 MULTIPLE THYROID NODULES: ICD-10-CM

## 2024-03-01 DIAGNOSIS — E89.0 POSTABLATIVE HYPOTHYROIDISM: ICD-10-CM

## 2024-03-01 LAB
25(OH)D3 SERPL-MCNC: 71.5 NG/ML (ref 30–100)
ALBUMIN SERPL BCP-MCNC: 4.1 G/DL (ref 3.5–5)
ALP SERPL-CCNC: 37 U/L (ref 34–104)
ALT SERPL W P-5'-P-CCNC: 22 U/L (ref 7–52)
ANION GAP SERPL CALCULATED.3IONS-SCNC: 6 MMOL/L
AST SERPL W P-5'-P-CCNC: 24 U/L (ref 13–39)
BILIRUB SERPL-MCNC: 0.45 MG/DL (ref 0.2–1)
BUN SERPL-MCNC: 44 MG/DL (ref 5–25)
CALCIUM SERPL-MCNC: 9.7 MG/DL (ref 8.4–10.2)
CHLORIDE SERPL-SCNC: 110 MMOL/L (ref 96–108)
CO2 SERPL-SCNC: 24 MMOL/L (ref 21–32)
CREAT SERPL-MCNC: 2.31 MG/DL (ref 0.6–1.3)
GFR SERPL CREATININE-BSD FRML MDRD: 20 ML/MIN/1.73SQ M
GLUCOSE P FAST SERPL-MCNC: 85 MG/DL (ref 65–99)
POTASSIUM SERPL-SCNC: 4.8 MMOL/L (ref 3.5–5.3)
PROT SERPL-MCNC: 7.7 G/DL (ref 6.4–8.4)
PTH-INTACT SERPL-MCNC: 259.2 PG/ML (ref 12–88)
SODIUM SERPL-SCNC: 140 MMOL/L (ref 135–147)
T4 FREE SERPL-MCNC: 0.86 NG/DL (ref 0.61–1.12)
TSH SERPL DL<=0.05 MIU/L-ACNC: 1.99 UIU/ML (ref 0.45–4.5)

## 2024-03-01 PROCEDURE — 80053 COMPREHEN METABOLIC PANEL: CPT

## 2024-03-01 PROCEDURE — 83970 ASSAY OF PARATHORMONE: CPT

## 2024-03-01 PROCEDURE — 84443 ASSAY THYROID STIM HORMONE: CPT

## 2024-03-01 PROCEDURE — 82306 VITAMIN D 25 HYDROXY: CPT

## 2024-03-01 PROCEDURE — 36415 COLL VENOUS BLD VENIPUNCTURE: CPT

## 2024-03-01 PROCEDURE — 84439 ASSAY OF FREE THYROXINE: CPT

## 2024-03-01 PROCEDURE — 76536 US EXAM OF HEAD AND NECK: CPT

## 2024-03-06 ENCOUNTER — OFFICE VISIT (OUTPATIENT)
Dept: ENDOCRINOLOGY | Facility: CLINIC | Age: 74
End: 2024-03-06
Payer: MEDICARE

## 2024-03-06 VITALS
HEART RATE: 68 BPM | BODY MASS INDEX: 23.27 KG/M2 | SYSTOLIC BLOOD PRESSURE: 136 MMHG | HEIGHT: 66 IN | WEIGHT: 144.8 LBS | DIASTOLIC BLOOD PRESSURE: 70 MMHG

## 2024-03-06 DIAGNOSIS — E89.0 POSTABLATIVE HYPOTHYROIDISM: Primary | ICD-10-CM

## 2024-03-06 DIAGNOSIS — E21.3 HYPERPARATHYROIDISM (HCC): ICD-10-CM

## 2024-03-06 DIAGNOSIS — E04.2 MULTIPLE THYROID NODULES: ICD-10-CM

## 2024-03-06 PROCEDURE — 99214 OFFICE O/P EST MOD 30 MIN: CPT | Performed by: STUDENT IN AN ORGANIZED HEALTH CARE EDUCATION/TRAINING PROGRAM

## 2024-03-06 RX ORDER — LEVOTHYROXINE SODIUM 100 MCG
TABLET ORAL
Qty: 107 TABLET | Refills: 3 | Status: SHIPPED | OUTPATIENT
Start: 2024-03-06

## 2024-03-06 RX ORDER — DENOSUMAB 60 MG/ML
60 INJECTION SUBCUTANEOUS ONCE
COMMUNITY

## 2024-03-06 RX ORDER — EVOLOCUMAB 140 MG/ML
INJECTION, SOLUTION SUBCUTANEOUS
COMMUNITY
Start: 2024-02-12

## 2024-03-06 NOTE — PROGRESS NOTES
Established Patient Progress Note      Referring Provider  No referring provider defined for this encounter.     History of Present Illness:     Ms. Megha Wan is a 74-year-old female with past medical history of thyroid nodule suspected to be Graves' status post radioactive iodine treatment and is now with ablative hypothyroidism.  Patient also has history of CKD 4 and hyperparathyroidism likely in setting of CKD.  At last appointment in this office on 03/02/2023, she was continued on Synthroid 100 mcg 5 days a week and 150 mcg on Saturday and Sunday.  She had labs done on 03/01/2024 showing normal TSH of 1.91 and normal T4 at 0.86.  PTH was elevated at 259.2, calcium 9.7, and 25-hydroxy vitamin D normal at 71.5.  Patient also had thyroid ultrasound done on 03/01/2024 which showed slightly inhomogenous thyroid bilaterally which is diffusely small consistent with history of radioablation.  There was 1 thyroid nodule in the right mid gland measuring 0.7 x 0.3 x 0.6 cm.  There was another nodule in the left mid gland measuring 1.4 x 0.8 x 0.8 cm.  These are an significantly changed from previous report.  These nodules are stable and do not meet criteria for biopsy.  She presents in clinic today for follow-up of her labs and thyroid ultrasound.  She denies any concerns or complaints at this time.  She denies any symptoms of hypothyroidism.  Also denies any symptoms of hypo or hypercalcemia.  She is compliant with her medications and states that she is taking Synthroid 100 mcg 5 days a week, 150 mcg on Saturday, and 200 mcg on Sunday.  She follows with her nephrologist regularly.      Patient Active Problem List   Diagnosis    Postablative hypothyroidism    Multiple thyroid nodules    Chronic kidney disease    Hypertension    Sjogren's disease (HCC)     Past Medical History:   Diagnosis Date    Renal disease       History reviewed. No pertinent surgical history.   Family History   Problem Relation Age of Onset     "Ovarian cancer Mother     Heart disease Father     Hypertension Father     Hyperlipidemia Father     Cancer Sister      Social History     Tobacco Use    Smoking status: Never    Smokeless tobacco: Never   Substance Use Topics    Alcohol use: Not on file     No Known Allergies      Review of Systems   Constitutional:  Negative for chills, fatigue and fever.   HENT:  Negative for congestion and rhinorrhea.    Respiratory:  Negative for cough, shortness of breath and wheezing.    Gastrointestinal:  Negative for abdominal distention, abdominal pain, constipation, diarrhea, nausea and vomiting.   Endocrine: Negative for cold intolerance and heat intolerance.   Musculoskeletal:  Positive for arthralgias. Negative for back pain.   Skin:  Negative for rash and wound.   Neurological:  Negative for dizziness, syncope, weakness, light-headedness and headaches.   Psychiatric/Behavioral:  Negative for agitation, behavioral problems and confusion.        Physical Exam:  Body mass index is 23.37 kg/m².  /70   Pulse 68   Ht 5' 6\" (1.676 m)   Wt 65.7 kg (144 lb 12.8 oz)   BMI 23.37 kg/m²        Physical Exam  Constitutional:       General: She is not in acute distress.     Appearance: Normal appearance.   Cardiovascular:      Rate and Rhythm: Normal rate and regular rhythm.      Pulses: Normal pulses.      Heart sounds: Normal heart sounds. No murmur heard.  Pulmonary:      Effort: Pulmonary effort is normal. No respiratory distress.      Breath sounds: Normal breath sounds. No wheezing, rhonchi or rales.   Abdominal:      General: Abdomen is flat. Bowel sounds are normal. There is no distension.      Palpations: Abdomen is soft.      Tenderness: There is no abdominal tenderness.   Musculoskeletal:      Cervical back: Normal range of motion and neck supple. No rigidity or tenderness.      Right lower leg: No edema.      Left lower leg: No edema.   Neurological:      Mental Status: She is alert and oriented to person, " "place, and time.   Psychiatric:         Mood and Affect: Mood normal.         Behavior: Behavior normal.         Thought Content: Thought content normal.         Labs:   No components found for: \"HA1C\"  No components found for: \"GLU\"    Lab Results   Component Value Date    CREATININE 2.31 (H) 03/01/2024    BUN 44 (H) 03/01/2024    K 4.8 03/01/2024     (H) 03/01/2024    CO2 24 03/01/2024     eGFR   Date Value Ref Range Status   03/01/2024 20 ml/min/1.73sq m Final     No components found for: \"MALBCRER\"    No results found for: \"CHOL\", \"HDL\", \"TRIG\", \"CHOLHDL\"    Lab Results   Component Value Date    ALT 22 03/01/2024    AST 24 03/01/2024    ALKPHOS 37 03/01/2024       Lab Results   Component Value Date    FREET4 0.86 03/01/2024       Impression:  1. Postablative hypothyroidism    2. Hyperparathyroidism (HCC)    3. Multiple thyroid nodules         Plan:    Diagnoses and all orders for this visit:    Postablative hypothyroidism  -     Comprehensive metabolic panel; Future  -     PTH, intact; Future  -     Vitamin D 25 hydroxy; Future  -     TSH, 3rd generation; Future  -     T4, free; Future  -     Synthroid 100 MCG tablet; TAKE 1 TABLET BY MOUTH   DAILY 5 DAYS PER WEEK, 1  AND 1/2 TABLETS ON  SATURDAY, AND 2 TABLETS ON  SUNDAYS    Hyperparathyroidism (HCC)  -     Comprehensive metabolic panel; Future  -     PTH, intact; Future  -     Vitamin D 25 hydroxy; Future  -     TSH, 3rd generation; Future  -     T4, free; Future    Multiple thyroid nodules  -     Comprehensive metabolic panel; Future  -     PTH, intact; Future  -     Vitamin D 25 hydroxy; Future  -     TSH, 3rd generation; Future  -     T4, free; Future      Reviewed normal TSH and T4 with patient at today's encounter.  Also reviewed stable thyroid ultrasound.  Discussed with patient that in light of stable thyroid ultrasound for greater than 5 years she may proceed with thyroid ultrasound every 2 years.  Patient agreed.  Will continue levothyroxine at " 100 mcg 5 days a week, 150 mcg on Saturday, 200 mcg on Sunday.  Continue follow-up with nephrology.  Patient recently restarted on calcitriol.  Calcium and vitamin D levels now normal.  Encourage patient to keep and attend upcoming nephrology appointments and ordered blood work.      Discussed with the patient and all questioned fully answered. She will call me if any problems arise.    Counseled patient on diagnostic results, prognosis, risk and benefit of treatment options, instruction for management, importance of treatment compliance, Risk  factor reduction and impressions      Nick Aguirre DO  Internal Medicine Resident, PGY-3

## 2024-03-12 ENCOUNTER — TELEPHONE (OUTPATIENT)
Age: 74
End: 2024-03-12

## 2024-03-12 NOTE — TELEPHONE ENCOUNTER
Pt called requesting to have labs faxed from Dr. Yousif to her rheumatology Dr. Moreira. Fax number [phone]. She believes the blood work she had done for Dr. Yousif is the same blood work her rheumatology doctor is requesting. She had the blood work done last week. Please call patient to let her know if this is possible.         Dr. Moreira (Rheumatology)  Fax: [phone]

## 2024-10-22 DIAGNOSIS — E89.0 POSTABLATIVE HYPOTHYROIDISM: ICD-10-CM

## 2024-10-22 RX ORDER — LEVOTHYROXINE SODIUM 100 MCG
TABLET ORAL
Qty: 111 TABLET | Refills: 1 | Status: SHIPPED | OUTPATIENT
Start: 2024-10-22

## 2025-02-10 DIAGNOSIS — E89.0 POSTABLATIVE HYPOTHYROIDISM: ICD-10-CM

## 2025-02-11 RX ORDER — LEVOTHYROXINE SODIUM 100 MCG
TABLET ORAL
Qty: 111 TABLET | Refills: 1 | Status: SHIPPED | OUTPATIENT
Start: 2025-02-11

## 2025-02-25 ENCOUNTER — APPOINTMENT (OUTPATIENT)
Dept: LAB | Facility: HOSPITAL | Age: 75
End: 2025-02-25
Payer: MEDICARE

## 2025-02-25 ENCOUNTER — RESULTS FOLLOW-UP (OUTPATIENT)
Dept: OTHER | Facility: HOSPITAL | Age: 75
End: 2025-02-25

## 2025-02-25 DIAGNOSIS — E04.2 MULTIPLE THYROID NODULES: ICD-10-CM

## 2025-02-25 DIAGNOSIS — E21.3 HYPERPARATHYROIDISM (HCC): ICD-10-CM

## 2025-02-25 DIAGNOSIS — E89.0 POSTABLATIVE HYPOTHYROIDISM: ICD-10-CM

## 2025-02-25 LAB
25(OH)D3 SERPL-MCNC: 58.7 NG/ML (ref 30–100)
ALBUMIN SERPL BCG-MCNC: 4 G/DL (ref 3.5–5)
ALP SERPL-CCNC: 29 U/L (ref 34–104)
ALT SERPL W P-5'-P-CCNC: 23 U/L (ref 7–52)
ANION GAP SERPL CALCULATED.3IONS-SCNC: 4 MMOL/L (ref 4–13)
AST SERPL W P-5'-P-CCNC: 30 U/L (ref 13–39)
BILIRUB SERPL-MCNC: 0.37 MG/DL (ref 0.2–1)
BUN SERPL-MCNC: 43 MG/DL (ref 5–25)
CALCIUM SERPL-MCNC: 9.5 MG/DL (ref 8.4–10.2)
CHLORIDE SERPL-SCNC: 110 MMOL/L (ref 96–108)
CO2 SERPL-SCNC: 27 MMOL/L (ref 21–32)
CREAT SERPL-MCNC: 2.75 MG/DL (ref 0.6–1.3)
GFR SERPL CREATININE-BSD FRML MDRD: 16 ML/MIN/1.73SQ M
GLUCOSE SERPL-MCNC: 95 MG/DL (ref 65–140)
POTASSIUM SERPL-SCNC: 4.9 MMOL/L (ref 3.5–5.3)
PROT SERPL-MCNC: 7.2 G/DL (ref 6.4–8.4)
PTH-INTACT SERPL-MCNC: 108.8 PG/ML (ref 12–88)
SODIUM SERPL-SCNC: 141 MMOL/L (ref 135–147)
T4 FREE SERPL-MCNC: 0.86 NG/DL (ref 0.61–1.12)
TSH SERPL DL<=0.05 MIU/L-ACNC: 1.44 UIU/ML (ref 0.45–4.5)

## 2025-02-25 PROCEDURE — 36415 COLL VENOUS BLD VENIPUNCTURE: CPT

## 2025-02-25 PROCEDURE — 80053 COMPREHEN METABOLIC PANEL: CPT

## 2025-02-25 PROCEDURE — 84439 ASSAY OF FREE THYROXINE: CPT

## 2025-02-25 PROCEDURE — 84443 ASSAY THYROID STIM HORMONE: CPT

## 2025-02-25 PROCEDURE — 82306 VITAMIN D 25 HYDROXY: CPT

## 2025-02-25 PROCEDURE — 83970 ASSAY OF PARATHORMONE: CPT

## 2025-03-04 ENCOUNTER — TELEPHONE (OUTPATIENT)
Dept: ENDOCRINOLOGY | Facility: CLINIC | Age: 75
End: 2025-03-04

## 2025-03-06 ENCOUNTER — OFFICE VISIT (OUTPATIENT)
Dept: ENDOCRINOLOGY | Facility: CLINIC | Age: 75
End: 2025-03-06
Payer: MEDICARE

## 2025-03-06 VITALS
BODY MASS INDEX: 23.11 KG/M2 | SYSTOLIC BLOOD PRESSURE: 130 MMHG | DIASTOLIC BLOOD PRESSURE: 70 MMHG | WEIGHT: 143.8 LBS | HEIGHT: 66 IN

## 2025-03-06 DIAGNOSIS — N18.4 CHRONIC KIDNEY DISEASE, STAGE 4 (SEVERE) (HCC): ICD-10-CM

## 2025-03-06 DIAGNOSIS — I10 HYPERTENSION, UNSPECIFIED TYPE: ICD-10-CM

## 2025-03-06 DIAGNOSIS — E55.9 VITAMIN D DEFICIENCY: ICD-10-CM

## 2025-03-06 DIAGNOSIS — E04.2 MULTIPLE THYROID NODULES: ICD-10-CM

## 2025-03-06 DIAGNOSIS — E89.0 POSTABLATIVE HYPOTHYROIDISM: Primary | ICD-10-CM

## 2025-03-06 DIAGNOSIS — I50.32 CHRONIC DIASTOLIC (CONGESTIVE) HEART FAILURE (HCC): ICD-10-CM

## 2025-03-06 PROCEDURE — G2211 COMPLEX E/M VISIT ADD ON: HCPCS | Performed by: PHYSICIAN ASSISTANT

## 2025-03-06 PROCEDURE — 99214 OFFICE O/P EST MOD 30 MIN: CPT | Performed by: PHYSICIAN ASSISTANT

## 2025-03-06 RX ORDER — LEVOTHYROXINE SODIUM 100 UG/1
TABLET ORAL
Qty: 111 TABLET | Refills: 1 | Status: SHIPPED | OUTPATIENT
Start: 2025-03-06 | End: 2025-03-06

## 2025-03-06 RX ORDER — LEVOTHYROXINE SODIUM 100 MCG
TABLET ORAL
Qty: 111 TABLET | Refills: 3 | Status: SHIPPED | OUTPATIENT
Start: 2025-03-06

## 2025-03-06 RX ORDER — LEVOTHYROXINE SODIUM 100 UG/1
TABLET ORAL
Qty: 111 TABLET | Refills: 3 | Status: SHIPPED | OUTPATIENT
Start: 2025-03-06 | End: 2025-03-06

## 2025-03-06 NOTE — PROGRESS NOTES
Name: Megha Wan      : 1950      MRN: [id number]  Encounter Provider: Jayne Armas PA-C  Encounter Date: 3/6/2025   Encounter department: [facility] [place]    Chief Complaint   Patient presents with    Hypothyroidism   :  Assessment & Plan  Postablative hypothyroidism  TSH: 1.436  T4: 0.86  Maintained on levothyroxine 100mcg on ,  150mcg,  200mcg  Submitted refill to [facility]  Orders:    TSH, 3rd generation with Free T4 reflex; Future    PTH, intact; Future    Vitamin D 25 hydroxy; Future    TSH, 3rd generation with Free T4 reflex; Future    Synthroid 100 MCG tablet; TAKE 1 TABLET BY MOUTH DAILY 5  DAYS PER WEEK , 1 AND 1/2  TABLETS BY MOUTH ON SATURDAY ,  AND 2 TABLETS BY MOUTH ON  SUNDAYS    US thyroid; Future    Multiple thyroid nodules  Stable ultrasound 2024, continue to monitor every 2 years  Orders:    TSH, 3rd generation with Free T4 reflex; Future    PTH, intact; Future    Vitamin D 25 hydroxy; Future    US thyroid; Future    Hypertension, unspecified type  BP at goal.   Continue current medication regimen.        Chronic diastolic (congestive) heart failure (HCC)  Wt Readings from Last 3 Encounters:   25 65.2 kg (143 lb 12.8 oz)   24 65.7 kg (144 lb 12.8 oz)   23 67.5 kg (148 lb 12.8 oz)   Follows with Cardiology       Chronic kidney disease, stage 4 (severe) (HCC)  Lab Results   Component Value Date    EGFR 16 2025    EGFR 20 2024    CREATININE 2.75 (H) 2025    CREATININE 2.31 (H) 2024   Follows with Nephrology       Vitamin D deficiency  Continue supplementation  Orders:    Vitamin D 25 hydroxy; Future        History of Present Illness     Megha Wan is a 75 y.o. 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 Denies weight fluctuations, mood instability, changes in hair growth patterns, difficulty swallowing, breathing or changes in voice.     She reports her plan would no longer cover brand specific Synthroid, though she believes she has been maintained on generic levothyroxine.  We discussed that on prior documentation, she was receiving brand specific.  She is willing to try [place] [facility] and plans to contact the office if she runs into any difficulty with this willing to try generic in the future if needed.        Pertinent Medical History   Past medical history of thyroid nodule suspected to be Graves' status post radioactive iodine treatment now with postablative hypothyroidism.      Review of Systems as per HPI       Medical History Reviewed by provider this encounter:     .    Objective   /70 (BP Location: Left arm, Patient Position: Sitting, Cuff Size: Adult)   Ht 5' 6\" (1.676 m)   Wt 65.2 kg (143 lb 12.8 oz)   BMI 23.21 kg/m²      Body mass index is 23.21 kg/m².  Wt Readings from Last 3 Encounters:   03/06/25 65.2 kg (143 lb 12.8 oz)   03/06/24 65.7 kg (144 lb 12.8 oz)   03/02/23 67.5 kg (148 lb 12.8 oz)     Physical Exam  Vitals and nursing note reviewed.   Constitutional:       General: She is not in acute distress.     Appearance: She is well-developed.   HENT:      Head: Normocephalic and atraumatic.   Eyes:      General: No scleral icterus.     Conjunctiva/sclera: Conjunctivae normal.   Neck:      Thyroid: No thyroid mass, thyromegaly or thyroid tenderness.   Pulmonary:      Effort: Pulmonary effort is normal.   Abdominal:      Palpations: Abdomen is soft.   Neurological:      Mental Status: She is alert.   Psychiatric:         Attention and Perception: Attention normal.         Labs:   No results found for: \"HGBA1C\"  Lab Results   Component " "Value Date    CREATININE 2.75 (H) 2025    CREATININE 2.31 (H) 2024    BUN 43 (H) 2025    K 4.9 2025     (H) 2025    CO2 27 2025     eGFR   Date Value Ref Range Status   2025 16 ml/min/1.73sq m Final     No results found for: \"CHOL\", \"HDL\", \"LDL\", \"TRIG\", \"CHOLHDL\"  Lab Results   Component Value Date    ALT 23 2025    AST 30 2025    ALKPHOS 29 (L) 2025     Lab Results   Component Value Date    ONS0NSYWAGBO 1.436 2025    BEA5DTPMBMGG 1.991 2024     Lab Results   Component Value Date    FREET4 0.86 2025       Patient Instructions   Ensure you are taking your thyroid medication at least one hour prior to meals, on an empty stomach and 4 hours before any vitamins/supplements  Obtain labs prior to follow-up appointment  Hold all biotin-containing supplements for three days prior to thyroid lab draws  Please get thyroid US in one year  Central [phone]    Discussed with the patient and all questioned fully answered. She will call me if any problems arise.      "

## 2025-03-06 NOTE — PATIENT INSTRUCTIONS
Ensure you are taking your thyroid medication at least one hour prior to meals, on an empty stomach and 4 hours before any vitamins/supplements  Obtain labs prior to follow-up appointment  Hold all biotin-containing supplements for three days prior to thyroid lab draws  Please get thyroid US in one year  Central [phone]

## 2025-03-06 NOTE — ASSESSMENT & PLAN NOTE
Stable ultrasound March 2024, continue to monitor every 2 years  Orders:    TSH, 3rd generation with Free T4 reflex; Future    PTH, intact; Future    Vitamin D 25 hydroxy; Future    US thyroid; Future

## 2025-03-06 NOTE — ASSESSMENT & PLAN NOTE
Lab Results   Component Value Date    EGFR 16 02/25/2025    EGFR 20 03/01/2024    CREATININE 2.75 (H) 02/25/2025    CREATININE 2.31 (H) 03/01/2024   Follows with Nephrology

## 2025-03-06 NOTE — ASSESSMENT & PLAN NOTE
Wt Readings from Last 3 Encounters:   03/06/25 65.2 kg (143 lb 12.8 oz)   03/06/24 65.7 kg (144 lb 12.8 oz)   03/02/23 67.5 kg (148 lb 12.8 oz)   Follows with Cardiology

## 2025-03-06 NOTE — ASSESSMENT & PLAN NOTE
TSH: 1.436  T4: 0.86  Maintained on levothyroxine 100mcg on weekdays, Saturdays 150mcg, Sunday 200mcg  Submitted refill to [facility]  Orders:    TSH, 3rd generation with Free T4 reflex; Future    PTH, intact; Future    Vitamin D 25 hydroxy; Future    TSH, 3rd generation with Free T4 reflex; Future    Synthroid 100 MCG tablet; TAKE 1 TABLET BY MOUTH DAILY 5  DAYS PER WEEK , 1 AND 1/2  TABLETS BY MOUTH ON SATURDAY ,  AND 2 TABLETS BY MOUTH ON  SUNDAYS    US thyroid; Future

## 2025-07-31 PROBLEM — F41.9 ANXIETY DISORDER, UNSPECIFIED: Status: ACTIVE | Noted: 2025-07-31

## 2025-07-31 PROBLEM — C44.91 BASAL CELL CARCINOMA OF SKIN, UNSPECIFIED: Status: ACTIVE | Noted: 2025-07-31

## 2025-07-31 PROBLEM — I25.10 ATHEROSCLEROTIC HEART DISEASE OF NATIVE CORONARY ARTERY WITHOUT ANGINA PECTORIS: Status: ACTIVE | Noted: 2025-07-31

## 2025-07-31 PROBLEM — D84.821: Status: ACTIVE | Noted: 2025-07-31

## 2025-07-31 PROBLEM — E03.9 HYPOTHYROIDISM, UNSPECIFIED: Status: ACTIVE | Noted: 2025-07-31

## 2025-07-31 PROBLEM — M85.80 OSTEOPENIA: Status: ACTIVE | Noted: 2025-07-31

## 2025-07-31 PROBLEM — K90.0 CELIAC DISEASE: Status: ACTIVE | Noted: 2025-07-31

## 2025-07-31 PROBLEM — Z79.899: Status: ACTIVE | Noted: 2025-07-31

## 2025-07-31 PROBLEM — E78.5 HYPERLIPIDEMIA, UNSPECIFIED: Status: ACTIVE | Noted: 2025-07-31

## 2025-07-31 PROBLEM — M19.031 PRIMARY OSTEOARTHRITIS, RIGHT WRIST: Status: ACTIVE | Noted: 2025-07-31

## 2025-07-31 PROBLEM — K21.9 GASTRO-ESOPHAGEAL REFLUX DISEASE WITHOUT ESOPHAGITIS: Status: ACTIVE | Noted: 2025-07-31

## 2025-07-31 PROBLEM — Z78.9 STATIN INTOLERANCE: Status: ACTIVE | Noted: 2025-07-31

## 2025-07-31 PROBLEM — H16.009: Status: ACTIVE | Noted: 2025-07-31

## 2025-07-31 PROBLEM — N25.81 HYPERPARATHYROIDISM DUE TO RENAL INSUFFICIENCY (HCC): Status: ACTIVE | Noted: 2025-07-31

## 2025-07-31 PROBLEM — B02.29 POSTHERPETIC NEURALGIA: Status: ACTIVE | Noted: 2025-07-31

## 2025-07-31 PROBLEM — N18.9 CHRONIC KIDNEY DISEASE, UNSPECIFIED: Status: ACTIVE | Noted: 2025-07-31

## 2025-08-04 ENCOUNTER — APPOINTMENT (OUTPATIENT)
Age: 75
End: 2025-08-04
Attending: INTERNAL MEDICINE
Payer: MEDICARE

## 2025-08-04 DIAGNOSIS — N18.4 CHRONIC KIDNEY DISEASE, STAGE IV (SEVERE) (HCC): ICD-10-CM

## 2025-08-04 DIAGNOSIS — D63.1 ANEMIA OF CHRONIC RENAL FAILURE, UNSPECIFIED CKD STAGE: ICD-10-CM

## 2025-08-04 DIAGNOSIS — N18.9 ANEMIA OF CHRONIC RENAL FAILURE, UNSPECIFIED CKD STAGE: ICD-10-CM

## 2025-08-04 LAB
ANION GAP SERPL CALCULATED.3IONS-SCNC: 7 MMOL/L (ref 4–13)
BUN SERPL-MCNC: 47 MG/DL (ref 5–25)
CALCIUM SERPL-MCNC: 9.3 MG/DL (ref 8.4–10.2)
CHLORIDE SERPL-SCNC: 109 MMOL/L (ref 96–108)
CO2 SERPL-SCNC: 23 MMOL/L (ref 21–32)
CREAT SERPL-MCNC: 2.81 MG/DL (ref 0.6–1.3)
GFR SERPL CREATININE-BSD FRML MDRD: 15 ML/MIN/1.73SQ M
GLUCOSE SERPL-MCNC: 79 MG/DL (ref 65–140)
HCT VFR BLD AUTO: 30.7 % (ref 34.8–46.1)
HGB BLD-MCNC: 9.6 G/DL (ref 11.5–15.4)
POTASSIUM SERPL-SCNC: 4.8 MMOL/L (ref 3.5–5.3)
SODIUM SERPL-SCNC: 139 MMOL/L (ref 135–147)

## 2025-08-04 PROCEDURE — 80048 BASIC METABOLIC PNL TOTAL CA: CPT

## 2025-08-04 PROCEDURE — 36415 COLL VENOUS BLD VENIPUNCTURE: CPT

## 2025-08-04 PROCEDURE — 85014 HEMATOCRIT: CPT

## 2025-08-04 PROCEDURE — 85018 HEMOGLOBIN: CPT
